# Patient Record
Sex: MALE | Race: WHITE | NOT HISPANIC OR LATINO | URBAN - METROPOLITAN AREA
[De-identification: names, ages, dates, MRNs, and addresses within clinical notes are randomized per-mention and may not be internally consistent; named-entity substitution may affect disease eponyms.]

---

## 2021-11-13 ENCOUNTER — EMERGENCY (EMERGENCY)
Facility: HOSPITAL | Age: 21
LOS: 1 days | Discharge: HOME | End: 2021-11-13
Attending: EMERGENCY MEDICINE | Admitting: EMERGENCY MEDICINE
Payer: COMMERCIAL

## 2021-11-13 VITALS
RESPIRATION RATE: 18 BRPM | HEART RATE: 63 BPM | OXYGEN SATURATION: 100 % | SYSTOLIC BLOOD PRESSURE: 131 MMHG | DIASTOLIC BLOOD PRESSURE: 83 MMHG | TEMPERATURE: 98 F

## 2021-11-13 VITALS
TEMPERATURE: 98 F | HEART RATE: 67 BPM | HEIGHT: 72 IN | RESPIRATION RATE: 16 BRPM | SYSTOLIC BLOOD PRESSURE: 126 MMHG | DIASTOLIC BLOOD PRESSURE: 81 MMHG | WEIGHT: 179.9 LBS | OXYGEN SATURATION: 97 %

## 2021-11-13 PROCEDURE — 99282 EMERGENCY DEPT VISIT SF MDM: CPT | Mod: 25

## 2021-11-13 PROCEDURE — 12013 RPR F/E/E/N/L/M 2.6-5.0 CM: CPT

## 2021-11-13 PROCEDURE — 99053 MED SERV 10PM-8AM 24 HR FAC: CPT

## 2021-11-13 PROCEDURE — 99283 EMERGENCY DEPT VISIT LOW MDM: CPT | Mod: 25

## 2021-11-13 NOTE — ED ADULT NURSE NOTE - OBJECTIVE STATEMENT
20 y/o male coming to the ER with c/o laceration to the chin. A&Ox4. Ambulatory. No significant PMH. Patient was playing basketball when he fell hitting his chin on the ground resulting in an approximately 2 inch laceration to the bottom of the chin. The  placed a steri strip. There is no active bleeding from the site. No involvement with the teeth. Unknown last tetanus. Patient denies chest pain, headache, blurry vision, fever, chills, n/v/d, urinary symptoms, abdominal pain.

## 2021-11-13 NOTE — ED PROVIDER NOTE - NSFOLLOWUPINSTRUCTIONS_ED_ALL_ED_FT
** You were seen in the ED for Chin Laceration **    1. Follow up with your primary medical doctor in 1-2 day     2. Keep sutures dry x 24 hours then clean with soap and water    3. Apply bacitracin ointment twice a day x 5 days    4. Return to the ER in 5 days for suture removal    5. Return to the ER for any persistent/worsening or new symptoms fevers, redness, swelling, discharge or any concerning symptoms

## 2021-11-13 NOTE — ED ADULT NURSE REASSESSMENT NOTE - NS ED NURSE REASSESS COMMENT FT1
Patient d/c. Reviewed d/c paperwork with patients, all questions answered at this time. Patient verbalizes understanding. Patient instructed to return to the ER for any worsening s/s including chest pain, SOB, fever, n/v/d. Patient alert and stable at time of d/c. Patient educated on suture care and provided with packets of bacitracin. Patient instructed to return to the ER in 5 days to have the sutures removed.

## 2021-11-13 NOTE — ED PROVIDER NOTE - CLINICAL SUMMARY MEDICAL DECISION MAKING FREE TEXT BOX
21yr student with fall on chin and laceration, no loc, no ongoing bleeding. exam notable for hemostatic 4cm wound of chin  plan for suture and f/u with student health.

## 2021-11-13 NOTE — ED PROVIDER NOTE - OBJECTIVE STATEMENT
21yr M no sig pmh, student, was playing basketball and was diving to grab the ball when landed on his chin and had laceration of chin. no head trauma, no LOC. sig bleeding but was controlled.  now hemostatic after school nurse fixed it. reports tdap 2019. no other complaints 21yr M no sig pmhx, student, was playing basketball and was diving to grab the ball when landed on his chin and had laceration of chin. no head trauma, no LOC. sig bleeding but was controlled.  now hemostatic after school nurse fixed it. reports tdap 2019. no other complaints

## 2021-11-13 NOTE — ED PROVIDER NOTE - PATIENT PORTAL LINK FT
You can access the FollowMyHealth Patient Portal offered by [facility] by registering at the following website: [email or web address]. By joining [employer]’s FollowMyHealth portal, you will also be able to view your health information using other applications (apps) compatible with our system.

## 2021-11-13 NOTE — ED ADULT TRIAGE NOTE - CCCP TRG CHIEF CMPLNT
Detail Level: Detailed
Add 04875 Cpt? (Important Note: In 2017 The Use Of 40637 Is Being Tracked By Cms To Determine Future Global Period Reimbursement For Global Periods): no
lacerations

## 2021-11-13 NOTE — ED ADULT NURSE NOTE - NSIMPLEMENTINTERV_GEN_ALL_ED
Implemented All Universal Safety Interventions:  [place] to call system. Call bell, personal items and telephone within reach. Instruct patient to call for assistance. Room bathroom lighting operational. Non-slip footwear when patient is off stretcher. Physically safe environment: no spills, clutter or unnecessary equipment. Stretcher in lowest position, wheels locked, appropriate side rails in place.

## 2021-11-13 NOTE — ED PROVIDER NOTE - PHYSICAL EXAMINATION
well appearing, alert and oriented  no distress  normal vitals  resp normal  no traumatic findings except the margin of mandible on leftside has 4cm lac hemostatic.  soft abd  clear lungs  S1-2 well appearing, alert and oriented  no distress  normal vitals  resp normal  no traumatic findings except the margin of mandible on leftside has 3cm lac hemostatic.  soft abd  clear lungs  S1-2

## 2022-07-27 ENCOUNTER — EMERGENCY (EMERGENCY)
Facility: HOSPITAL | Age: 22
LOS: 1 days | Discharge: HOME | End: 2022-07-27
Attending: EMERGENCY MEDICINE
Payer: COMMERCIAL

## 2022-07-27 VITALS
RESPIRATION RATE: 16 BRPM | DIASTOLIC BLOOD PRESSURE: 78 MMHG | HEART RATE: 90 BPM | OXYGEN SATURATION: 100 % | SYSTOLIC BLOOD PRESSURE: 135 MMHG

## 2022-07-27 VITALS
HEIGHT: 72 IN | DIASTOLIC BLOOD PRESSURE: 94 MMHG | SYSTOLIC BLOOD PRESSURE: 128 MMHG | OXYGEN SATURATION: 98 % | WEIGHT: 154.98 LBS | RESPIRATION RATE: 18 BRPM | HEART RATE: 97 BPM | TEMPERATURE: 98 F

## 2022-07-27 PROCEDURE — 73140 X-RAY EXAM OF FINGER(S): CPT

## 2022-07-27 PROCEDURE — 12002 RPR S/N/AX/GEN/TRNK2.6-7.5CM: CPT | Mod: XU

## 2022-07-27 PROCEDURE — 73140 X-RAY EXAM OF FINGER(S): CPT | Mod: 26,LT

## 2022-07-27 PROCEDURE — 99284 EMERGENCY DEPT VISIT MOD MDM: CPT | Mod: 25

## 2022-07-27 PROCEDURE — 99283 EMERGENCY DEPT VISIT LOW MDM: CPT

## 2022-07-27 PROCEDURE — 29125 APPL SHORT ARM SPLINT STATIC: CPT | Mod: LT

## 2022-07-27 RX ORDER — ACETAMINOPHEN 500 MG
975 TABLET ORAL ONCE
Refills: 0 | Status: COMPLETED | OUTPATIENT
Start: 2022-07-27 | End: 2022-07-27

## 2022-07-27 RX ADMIN — Medication 975 MILLIGRAM(S): at 19:38

## 2022-07-27 NOTE — ED PROVIDER NOTE - OBJECTIVE STATEMENT
21yo M no pmh, R hand dominant presents for evaluation of L thumb lacerations by scissors while cutting boxer at [facility] [employer] today. Tdap UTD 5/2019. +Decreased ROM w flexion. No sensory changes. No other injury. 21yo M no pmh, R hand dominant presents for evaluation of L thumb lacerations by scissors while cutting boxer at [employer] [employer] today. Tdap UTD 5/2019. +Decreased ROM w flexion. No sensory changes. No other injury.      Attn - pt seen in [facility] - pt is [employer] and cut left thumb with scissors - dorsal and volar. no numbness or weakness.  Tdap is UTD.

## 2022-07-27 NOTE — ED PROVIDER NOTE - NSFOLLOWUPINSTRUCTIONS_ED_ALL_ED_FT
20-Aug-2021 04:01
1. Rest. Keep site clean and dry. Do not wet for 24 hours. Apply bacitracin daily. Change dressing daily.  2. Take motrin 600mg by mouth every 8 hours as needed for pain. Take with food.  3. Follow up with Dr Freed as planned tomorrow.   4. Return to ER sooner for redness, swelling, red streaks, purulent drainage, fever or any concern.

## 2022-07-27 NOTE — ED PROVIDER NOTE - CARE PLAN
1 Principal Discharge DX:	Finger laceration  Assessment and plan of treatment:	L thumb lacerations by scissors  -Tdap UTD  -XR r/o fx  -Lac repair  -Reevaluate ROM after anesthetization

## 2022-07-27 NOTE — PROGRESS NOTE ADULT - SUBJECTIVE AND OBJECTIVE BOX
Stanton Freed MD & Doreen JACKSON  Plastic & Reconstructive Surgery  [address] [address]    [phone]    Patient Info:AQUILES [id number]  [facility] ED  The patient is a  22y  Males/p injury with scissors  with open wound left thumb and FPL injury      Asked to evaluate by ER  Unable to perform complete neuro exam as patient was blocked before my evaluation    T(C): 36.9 (07-27-22 @ 19:40), Max: 36.9 (07-27-22 @ 19:40)  HR: 74 (07-27-22 @ 19:40) (74 - 97)  BP: 123/76 (07-27-22 @ 19:40) (123/76 - 128/94)  RR: 16 (07-27-22 @ 19:40) (16 - 18)  SpO2: 99% (07-27-22 @ 19:40) (98% - 99%)    Wounds repaired and splint placed  Will need OR for FPL repair possible digital nerve repair possible EPL repair    Will schedule for OR next week

## 2022-07-27 NOTE — ED PROVIDER NOTE - CLINICAL SUMMARY MEDICAL DECISION MAKING FREE TEXT BOX
Attn - [employer] cut left thumb with scissors dorsal and volar - xray, local anesthesia and stress tendon/visualize tendon if poss. suture both lacerations.

## 2022-07-27 NOTE — ED PROVIDER NOTE - PLAN OF CARE
L thumb lacerations by scissors  -Tdap UTD  -XR r/o fx  -Lac repair  -Reevaluate ROM after anesthetization

## 2022-07-27 NOTE — ED PROVIDER NOTE - PHYSICAL EXAMINATION
Attn - alert, mod pain, left thumb - 1.5 cm laceration on dorsal aspect of thumb and 1.5 cm laceration volar thumb as well.  NV intact. no extensor or flexor lag - difficulty flexing thumb due to pain - no FB.

## 2022-07-27 NOTE — ED PROVIDER NOTE - CARE PROVIDER_API CALL
Stanton Freed)  Plastic Surgery; Surgery; Surgical Critical Care  [address] [place] [address]  Phone: [phone]  Fax: [phone]  Follow Up Time:

## 2022-07-27 NOTE — ED PROVIDER NOTE - PATIENT PORTAL LINK FT
You can access the FollowMyHealth Patient Portal offered by [facility] by registering at the following website: [email or web address]. By joining [employer]’s FollowMyHealth portal, you will also be able to view your health information using other applications (apps) compatible with our system.

## 2022-07-27 NOTE — ED PROVIDER NOTE - PROGRESS NOTE DETAILS
Reassessment after digital block w some ROM of distal thumb, then pop palpable when with minimal resistance, and now unable to flex. Hand consulted for flexor tendon rupture. -Rebekah Ridley PA-C Repaired by Dr Freed. Plan to follow up in office tomorrow. Stable for dc. -Rebekah Ridley PA-C

## 2022-07-27 NOTE — ED PROVIDER NOTE - SKIN, MLM
Skin normal color for race, warm, dry. L Thumb w 2 lacerations, 2cm to extensor surface of distal phalanx, and 2cm to flexor surface of proximal phalanx w subcutaneous fat exposed. ROM w flexion limited ?? 2/2 pain.

## 2022-07-27 NOTE — ED ADULT NURSE NOTE - OBJECTIVE STATEMENT
pt ambulatory to room 32L c/o lacerati npt unable to flex or extend thumb.on on L thumb from scissors. pt was cutting boxes. pt sustainred 1.5 cm lac on back of thumb < 2 sec cap refil. hemostasis controlled tetnus utd.

## 2024-02-22 NOTE — ED PROCEDURE NOTE - CPROC ED TOLERANCE1
[FreeTextEntry1] : Last office visit was 2021.  Successful weight loss of 140 lbs since last visit.  Good tolerance of combination of lamotrigine 200mg tid, and topiramate 200mg at night. Sometimes, she forgets mid-day dose. Had recent blood work, reportedly shows low lamotrigine level. She is to go for another weight loss surgery next week.   Overall stable. No episodic visual symptoms. Dizziness is not frequent, though unpredictable. On clonazepam as needed for anxiety.  No fall since last visit.  Numbness and weakness of right hand when she is tired. She dropped objects off the right hand if she pushes herself too hard. Still has intermittent neck pain. She is to have injectable treatment for cervical spine.  Intermittent headache, same characteristic. Still with cognitive and speech complaints, not related to topiramate.  She had cognitive testing about 1 year ago. She did not perform well. However, not knowing details of report.  Had work up for immunological dysfunction last year, largely unremarkable.  
Patient tolerated procedure well.